# Patient Record
Sex: MALE | URBAN - METROPOLITAN AREA
[De-identification: names, ages, dates, MRNs, and addresses within clinical notes are randomized per-mention and may not be internally consistent; named-entity substitution may affect disease eponyms.]

---

## 2023-04-17 ENCOUNTER — OFFICE VISIT (OUTPATIENT)
Dept: URBAN - METROPOLITAN AREA CLINIC 80 | Facility: CLINIC | Age: 33
End: 2023-04-17
Payer: COMMERCIAL

## 2023-04-17 VITALS — HEIGHT: 74 IN | TEMPERATURE: 97.9 F | BODY MASS INDEX: 25.23 KG/M2 | WEIGHT: 196.6 LBS

## 2023-04-17 DIAGNOSIS — K64.1 GRADE II HEMORRHOIDS: ICD-10-CM

## 2023-04-17 DIAGNOSIS — R19.4 CHANGE IN BOWEL HABITS: ICD-10-CM

## 2023-04-17 DIAGNOSIS — R10.84 ABDOMINAL PAIN, GENERALIZED: ICD-10-CM

## 2023-04-17 DIAGNOSIS — K92.1 HEMATOCHEZIA: ICD-10-CM

## 2023-04-17 PROCEDURE — 46600 DIAGNOSTIC ANOSCOPY SPX: CPT | Performed by: INTERNAL MEDICINE

## 2023-04-17 PROCEDURE — 99203 OFFICE O/P NEW LOW 30 MIN: CPT | Performed by: INTERNAL MEDICINE

## 2023-04-17 PROCEDURE — 99243 OFF/OP CNSLTJ NEW/EST LOW 30: CPT | Performed by: INTERNAL MEDICINE

## 2023-04-17 RX ORDER — SOD SULF/POT CHLORIDE/MAG SULF 1.479 G
AS DIRECTED TABLET ORAL
Qty: 24 TABLET | Refills: 0 | OUTPATIENT
Start: 2023-04-17 | End: 2023-04-18

## 2023-04-17 NOTE — PHYSICAL EXAM GASTROINTESTINAL
Abdomen , soft, nontender, nondistended , no guarding or rigidity , no masses palpable , normal bowel sounds , Liver and Spleen , no hepatomegaly present , no hepatosplenomegaly , liver nontender , spleen not palpable.  Rectal:  normal perianal exam.  no external hemorrhoids.  krysten with some signs of posterior anal fissure.  no bleeding or rectal mass noted.  anoscopy with no obvious fissure.  noted grade II internal hemorrhoids

## 2023-04-17 NOTE — HPI-TODAY'S VISIT:
pt referred by Dr. Wayne Wallace for bowel changes, abdominal pain and hematochezia pt notes years of irregular stools with 3-4 bm/day.  BSS 4-5 occ brb on wiping assoc rectal irritation without colette pain assoc periumbilical abd pain improves with bm no n/v notes normal appetite notes stable weight normal energy no arthritis or rashes no fam hx of ibd  he eats high fiber diet which helps he avoids dairy which helps also  no other complaints

## 2023-05-19 ENCOUNTER — OFFICE VISIT (OUTPATIENT)
Dept: URBAN - METROPOLITAN AREA SURGERY CENTER 31 | Facility: SURGERY CENTER | Age: 33
End: 2023-05-19

## 2023-07-06 ENCOUNTER — OFFICE VISIT (OUTPATIENT)
Dept: URBAN - METROPOLITAN AREA SURGERY CENTER 31 | Facility: SURGERY CENTER | Age: 33
End: 2023-07-06